# Patient Record
Sex: MALE | Race: WHITE | NOT HISPANIC OR LATINO | URBAN - METROPOLITAN AREA
[De-identification: names, ages, dates, MRNs, and addresses within clinical notes are randomized per-mention and may not be internally consistent; named-entity substitution may affect disease eponyms.]

---

## 2020-01-28 ENCOUNTER — OUTPATIENT (OUTPATIENT)
Dept: OUTPATIENT SERVICES | Facility: HOSPITAL | Age: 70
LOS: 1 days | Discharge: HOME | End: 2020-01-28
Payer: MEDICARE

## 2020-01-28 VITALS
DIASTOLIC BLOOD PRESSURE: 80 MMHG | RESPIRATION RATE: 16 BRPM | OXYGEN SATURATION: 96 % | WEIGHT: 186.73 LBS | HEIGHT: 68 IN | SYSTOLIC BLOOD PRESSURE: 145 MMHG | TEMPERATURE: 98 F | HEART RATE: 78 BPM

## 2020-01-28 DIAGNOSIS — Z98.890 OTHER SPECIFIED POSTPROCEDURAL STATES: Chronic | ICD-10-CM

## 2020-01-28 DIAGNOSIS — M19.90 UNSPECIFIED OSTEOARTHRITIS, UNSPECIFIED SITE: ICD-10-CM

## 2020-01-28 DIAGNOSIS — Z87.81 PERSONAL HISTORY OF (HEALED) TRAUMATIC FRACTURE: Chronic | ICD-10-CM

## 2020-01-28 DIAGNOSIS — I10 ESSENTIAL (PRIMARY) HYPERTENSION: ICD-10-CM

## 2020-01-28 DIAGNOSIS — Z01.818 ENCOUNTER FOR OTHER PREPROCEDURAL EXAMINATION: ICD-10-CM

## 2020-01-28 DIAGNOSIS — E11.9 TYPE 2 DIABETES MELLITUS WITHOUT COMPLICATIONS: ICD-10-CM

## 2020-01-28 DIAGNOSIS — I25.10 ATHEROSCLEROTIC HEART DISEASE OF NATIVE CORONARY ARTERY WITHOUT ANGINA PECTORIS: ICD-10-CM

## 2020-01-28 DIAGNOSIS — Z96.0 PRESENCE OF UROGENITAL IMPLANTS: Chronic | ICD-10-CM

## 2020-01-28 DIAGNOSIS — M16.12 UNILATERAL PRIMARY OSTEOARTHRITIS, LEFT HIP: ICD-10-CM

## 2020-01-28 LAB
ANION GAP SERPL CALC-SCNC: 6 MMOL/L — SIGNIFICANT CHANGE UP (ref 5–17)
APTT BLD: 27.1 SEC — LOW (ref 28.5–37)
BASOPHILS # BLD AUTO: 0.03 K/UL — SIGNIFICANT CHANGE UP (ref 0–0.2)
BASOPHILS NFR BLD AUTO: 0.4 % — SIGNIFICANT CHANGE UP (ref 0–2)
BUN SERPL-MCNC: 19 MG/DL — SIGNIFICANT CHANGE UP (ref 7–23)
CALCIUM SERPL-MCNC: 9.4 MG/DL — SIGNIFICANT CHANGE UP (ref 8.5–10.1)
CHLORIDE SERPL-SCNC: 104 MMOL/L — SIGNIFICANT CHANGE UP (ref 96–108)
CO2 SERPL-SCNC: 28 MMOL/L — SIGNIFICANT CHANGE UP (ref 22–31)
CREAT SERPL-MCNC: 0.95 MG/DL — SIGNIFICANT CHANGE UP (ref 0.5–1.3)
EOSINOPHIL # BLD AUTO: 0.15 K/UL — SIGNIFICANT CHANGE UP (ref 0–0.5)
EOSINOPHIL NFR BLD AUTO: 2 % — SIGNIFICANT CHANGE UP (ref 0–6)
GLUCOSE SERPL-MCNC: 112 MG/DL — HIGH (ref 70–99)
HBA1C BLD-MCNC: 6.6 % — HIGH (ref 4–5.6)
HCT VFR BLD CALC: 38.9 % — LOW (ref 39–50)
HGB BLD-MCNC: 13.3 G/DL — SIGNIFICANT CHANGE UP (ref 13–17)
IMM GRANULOCYTES NFR BLD AUTO: 0.4 % — SIGNIFICANT CHANGE UP (ref 0–1.5)
INR BLD: 1.01 RATIO — SIGNIFICANT CHANGE UP (ref 0.88–1.16)
LYMPHOCYTES # BLD AUTO: 1.44 K/UL — SIGNIFICANT CHANGE UP (ref 1–3.3)
LYMPHOCYTES # BLD AUTO: 18.7 % — SIGNIFICANT CHANGE UP (ref 13–44)
MCHC RBC-ENTMCNC: 31.4 PG — SIGNIFICANT CHANGE UP (ref 27–34)
MCHC RBC-ENTMCNC: 34.2 GM/DL — SIGNIFICANT CHANGE UP (ref 32–36)
MCV RBC AUTO: 91.7 FL — SIGNIFICANT CHANGE UP (ref 80–100)
MONOCYTES # BLD AUTO: 0.69 K/UL — SIGNIFICANT CHANGE UP (ref 0–0.9)
MONOCYTES NFR BLD AUTO: 9 % — SIGNIFICANT CHANGE UP (ref 2–14)
MRSA PCR RESULT.: SIGNIFICANT CHANGE UP
NEUTROPHILS # BLD AUTO: 5.35 K/UL — SIGNIFICANT CHANGE UP (ref 1.8–7.4)
NEUTROPHILS NFR BLD AUTO: 69.5 % — SIGNIFICANT CHANGE UP (ref 43–77)
NRBC # BLD: 0 /100 WBCS — SIGNIFICANT CHANGE UP (ref 0–0)
PLATELET # BLD AUTO: 143 K/UL — LOW (ref 150–400)
POTASSIUM SERPL-MCNC: 4.5 MMOL/L — SIGNIFICANT CHANGE UP (ref 3.5–5.3)
POTASSIUM SERPL-SCNC: 4.5 MMOL/L — SIGNIFICANT CHANGE UP (ref 3.5–5.3)
PROTHROM AB SERPL-ACNC: 11.3 SEC — SIGNIFICANT CHANGE UP (ref 10–12.9)
RBC # BLD: 4.24 M/UL — SIGNIFICANT CHANGE UP (ref 4.2–5.8)
RBC # FLD: 13.2 % — SIGNIFICANT CHANGE UP (ref 10.3–14.5)
S AUREUS DNA NOSE QL NAA+PROBE: DETECTED
SODIUM SERPL-SCNC: 138 MMOL/L — SIGNIFICANT CHANGE UP (ref 135–145)
WBC # BLD: 7.69 K/UL — SIGNIFICANT CHANGE UP (ref 3.8–10.5)
WBC # FLD AUTO: 7.69 K/UL — SIGNIFICANT CHANGE UP (ref 3.8–10.5)

## 2020-01-28 PROCEDURE — 93010 ELECTROCARDIOGRAM REPORT: CPT

## 2020-01-28 NOTE — PHYSICAL THERAPY INITIAL EVALUATION ADULT - ADDITIONAL COMMENTS
Pt lives in a private house with a side entrance that has no steps to enter, then once inside there are 4 steps with 1 handrail to get to main living area. There is a full flight with 1 handrail to 2nd floor, only half bathroom on main level. Bathroom on 2nd floor is a tub shower with no grab bars, retractable shower head, standard height toilet. Pt reports that a 3:1 commode can fit over toilet. Pt does not have/use dme. Pain is 2/10 at rest and can increase to 7/10 with yoga, ambulation, donning/doffing socks. Pt finds relief with CBD oil and meditation, tylenol prn. Pt wears glasses, R hand dominant, drives, no hearing aids.

## 2020-01-28 NOTE — H&P PST ADULT - HISTORY OF PRESENT ILLNESS
70 year old male with PMH of HTN, DM, Stilos disease presents with left hip pain secondary to osteoarthritis - scheduled for left hip arthroplasty  Goal: Resume Yoga

## 2020-01-28 NOTE — H&P PST ADULT - NSICDXPASTMEDICALHX_GEN_ALL_CORE_FT
PAST MEDICAL HISTORY:  BPH without urinary obstruction     DM (diabetes mellitus)     HTN (hypertension) PAST MEDICAL HISTORY:  BPH without urinary obstruction     DM (diabetes mellitus)     H/O ulcerative colitis     HTN (hypertension)

## 2020-01-28 NOTE — H&P PST ADULT - NSICDXPROBLEM_GEN_ALL_CORE_FT
PROBLEM DIAGNOSES  Problem: Osteoarthritis  Assessment and Plan: scheduled for left hip arthroplasty    Problem: Preoperative examination  Assessment and Plan: labs - cbc,pt/ptt,bmp,t&s,nose cx,ekg  M/C required  preop 3 day hibiclens instruction reviewed and given .instructed on if  nose cx positive use mupuricin 5 days and checklist given  take routine meds DOS with sips of water. avoid NSAID and OTC supplements. verbalized understanding  information on proper nutrition , increase protein and better food choices provided in packet      Problem: Arteriosclerotic heart disease (ASHD)  Assessment and Plan: continue meds    Problem: DM (diabetes mellitus)  Assessment and Plan: continue meds    Problem: HTN (hypertension)  Assessment and Plan: continue meds

## 2020-01-28 NOTE — H&P PST ADULT - ASSESSMENT
osteoarthritis left hip  CAPRINI SCORE    AGE RELATED RISK FACTORS                                                       MOBILITY RELATED FACTORS  [ ] Age 41-60 years                                            (1 Point)                  [ ] Bed rest                                                        (1 Point)  [x] Age: 61-74 years                                           (2 Points)                [ ] Plaster cast                                                   (2 Points)  [ ] Age= 75 years                                              (3 Points)                 [ ] Bed bound for more than 72 hours                   (2 Points)    DISEASE RELATED RISK FACTORS                                               GENDER SPECIFIC FACTORS  [ ] Edema in the lower extremities                       (1 Point)                  [ ] Pregnancy                                                     (1 Point)  [ ] Varicose veins                                               (1 Point)                  [ ] Post-partum < 6 weeks                                   (1 Point)             [x ] BMI > 25 Kg/m2                                            (1 Point)                  [ ] Hormonal therapy  or oral contraception            (1 Point)                 [ ] Sepsis (in the previous month)                        (1 Point)                  [ ] History of pregnancy complications  [ ] Pneumonia or serious lung disease                                               [ ] Unexplained or recurrent                       (1 Point)           (in the previous month)                               (1 Point)  [ ] Abnormal pulmonary function test                     (1 Point)                 SURGERY RELATED RISK FACTORS  [ ] Acute myocardial infarction                              (1 Point)                 [ ]  Section                                            (1 Point)  [ ] Congestive heart failure (in the previous month)  (1 Point)                 [ ] Minor surgery                                                 (1 Point)   [x ] Inflammatory bowel disease                             (1 Point)                 [ ] Arthroscopic surgery                                        (2 Points)  [ ] Central venous access                                    (2 Points)                [ ] General surgery lasting more than 45 minutes   (2 Points)       [ ] Stroke (in the previous month)                          (5 Points)               [x ] Elective arthroplasty                                        (5 Points)                                                                                                                                               HEMATOLOGY RELATED FACTORS                                                 TRAUMA RELATED RISK FACTORS  [ ] Prior episodes of VTE                                     (3 Points)                 [ ] Fracture of the hip, pelvis, or leg                       (5 Points)  [ ] Positive family history for VTE                         (3 Points)                 [ ] Acute spinal cord injury (in the previous month)  (5 Points)  [ ] Prothrombin 44355 A                                      (3 Points)                 [ ] Paralysis  (less than 1 month)                          (5 Points)  [ ] Factor V Leiden                                             (3 Points)                 [ ] Multiple Trauma within 1 month                         (5 Points)  [ ] Lupus anticoagulants                                     (3 Points)                                                           [ ] Anticardiolipin antibodies                                (3 Points)                                                       [ ] High homocysteine in the blood                      (3 Points)                                             [ ] Other congenital or acquired thrombophilia       (3 Points)                                                [ ] Heparin induced thrombocytopenia                  (3 Points)                                          Total Score [     9     ]  Caprini Score 0-2: Low risk, No VTE Prophylaxis required for most patient's, encourage ambulation  Caprini Score 3-6: At Risk, Pharmacologic VTE prophylaxis is indicated for most patients ( in the absence of a contraindication)  Caprini Score Greater than or = 7: High Risk , pharmacologic VTE is indicated for most patients ( in the absence of a contraindication)    Caprini score indicates that the patient is high risk for VTE event ( score 6 or greater). Surgical patient's in this group will benefit from both pharmacologic prophylaxis and intermittent compression devices . Surgical team will determine the balance between VTE  risk and bleeding risk and other clinical considerations

## 2020-01-28 NOTE — PHYSICAL THERAPY INITIAL EVALUATION ADULT - MODIFIED CLINICAL TEST OF SENSORY INTEGRATION IN BALANCE TEST
5x Sit to Stand Test = (no hands use) 20.40 seconds, indicating significant impairment c functional mobility & strength  ; 2 Minute Walk Test = 500 feet without devices or rest stops

## 2020-01-28 NOTE — H&P PST ADULT - NSICDXPASTSURGICALHX_GEN_ALL_CORE_FT
PAST SURGICAL HISTORY:  H/O fracture of arm     History of penile implant     S/P hip arthroscopy     S/P primary angioplasty x 2v stents 2000 and 2016    S/P surgery on nasal septum

## 2020-01-29 RX ORDER — MUPIROCIN 20 MG/G
1 OINTMENT TOPICAL
Qty: 1 | Refills: 0
Start: 2020-01-29 | End: 2020-02-02

## 2020-02-12 ENCOUNTER — OUTPATIENT (OUTPATIENT)
Dept: OUTPATIENT SERVICES | Facility: HOSPITAL | Age: 70
LOS: 1 days | Discharge: HOME HEALTH | End: 2020-02-12

## 2020-02-12 DIAGNOSIS — Z87.81 PERSONAL HISTORY OF (HEALED) TRAUMATIC FRACTURE: Chronic | ICD-10-CM

## 2020-02-12 DIAGNOSIS — Z98.890 OTHER SPECIFIED POSTPROCEDURAL STATES: Chronic | ICD-10-CM

## 2020-02-12 DIAGNOSIS — Z96.0 PRESENCE OF UROGENITAL IMPLANTS: Chronic | ICD-10-CM

## 2020-02-12 RX ORDER — TAMSULOSIN HYDROCHLORIDE 0.4 MG/1
1 CAPSULE ORAL
Qty: 0 | Refills: 0 | DISCHARGE

## 2020-02-12 RX ORDER — LISINOPRIL 2.5 MG/1
1 TABLET ORAL
Qty: 0 | Refills: 0 | DISCHARGE

## 2020-02-12 RX ORDER — ALLOPURINOL 300 MG
1 TABLET ORAL
Qty: 0 | Refills: 0 | DISCHARGE

## 2020-02-12 RX ORDER — METFORMIN HYDROCHLORIDE 850 MG/1
1 TABLET ORAL
Qty: 0 | Refills: 0 | DISCHARGE

## 2020-02-12 RX ORDER — SAXAGLIPTIN 5 MG/1
1 TABLET, FILM COATED ORAL
Qty: 0 | Refills: 0 | DISCHARGE

## 2020-02-13 RX ORDER — ASPIRIN/CALCIUM CARB/MAGNESIUM 324 MG
1 TABLET ORAL
Qty: 0 | Refills: 0 | DISCHARGE

## 2020-02-17 ENCOUNTER — EMERGENCY (EMERGENCY)
Facility: HOSPITAL | Age: 70
LOS: 0 days | Discharge: HOME | End: 2020-02-18
Attending: EMERGENCY MEDICINE
Payer: MEDICARE

## 2020-02-17 VITALS
HEART RATE: 72 BPM | DIASTOLIC BLOOD PRESSURE: 69 MMHG | RESPIRATION RATE: 18 BRPM | SYSTOLIC BLOOD PRESSURE: 147 MMHG | TEMPERATURE: 99 F | OXYGEN SATURATION: 98 %

## 2020-02-17 VITALS
HEART RATE: 85 BPM | OXYGEN SATURATION: 99 % | DIASTOLIC BLOOD PRESSURE: 71 MMHG | TEMPERATURE: 99 F | RESPIRATION RATE: 18 BRPM | HEIGHT: 68 IN | SYSTOLIC BLOOD PRESSURE: 145 MMHG | WEIGHT: 175.05 LBS

## 2020-02-17 DIAGNOSIS — D64.89 OTHER SPECIFIED ANEMIAS: ICD-10-CM

## 2020-02-17 DIAGNOSIS — Z91.013 ALLERGY TO SEAFOOD: ICD-10-CM

## 2020-02-17 DIAGNOSIS — R06.00 DYSPNEA, UNSPECIFIED: ICD-10-CM

## 2020-02-17 DIAGNOSIS — Z79.82 LONG TERM (CURRENT) USE OF ASPIRIN: ICD-10-CM

## 2020-02-17 DIAGNOSIS — Z79.84 LONG TERM (CURRENT) USE OF ORAL HYPOGLYCEMIC DRUGS: ICD-10-CM

## 2020-02-17 DIAGNOSIS — Z98.890 OTHER SPECIFIED POSTPROCEDURAL STATES: Chronic | ICD-10-CM

## 2020-02-17 DIAGNOSIS — R68.83 CHILLS (WITHOUT FEVER): ICD-10-CM

## 2020-02-17 DIAGNOSIS — R06.02 SHORTNESS OF BREATH: ICD-10-CM

## 2020-02-17 DIAGNOSIS — Z96.0 PRESENCE OF UROGENITAL IMPLANTS: Chronic | ICD-10-CM

## 2020-02-17 DIAGNOSIS — E11.9 TYPE 2 DIABETES MELLITUS WITHOUT COMPLICATIONS: ICD-10-CM

## 2020-02-17 DIAGNOSIS — N40.0 BENIGN PROSTATIC HYPERPLASIA WITHOUT LOWER URINARY TRACT SYMPTOMS: ICD-10-CM

## 2020-02-17 DIAGNOSIS — I10 ESSENTIAL (PRIMARY) HYPERTENSION: ICD-10-CM

## 2020-02-17 DIAGNOSIS — Z87.81 PERSONAL HISTORY OF (HEALED) TRAUMATIC FRACTURE: Chronic | ICD-10-CM

## 2020-02-17 DIAGNOSIS — Z88.0 ALLERGY STATUS TO PENICILLIN: ICD-10-CM

## 2020-02-17 DIAGNOSIS — Z88.8 ALLERGY STATUS TO OTHER DRUGS, MEDICAMENTS AND BIOLOGICAL SUBSTANCES STATUS: ICD-10-CM

## 2020-02-17 PROBLEM — Z87.19 PERSONAL HISTORY OF OTHER DISEASES OF THE DIGESTIVE SYSTEM: Chronic | Status: ACTIVE | Noted: 2020-01-29

## 2020-02-17 LAB
ALBUMIN SERPL ELPH-MCNC: 2.9 G/DL — LOW (ref 3.3–5)
ALP SERPL-CCNC: 56 U/L — SIGNIFICANT CHANGE UP (ref 40–120)
ALT FLD-CCNC: 28 U/L — SIGNIFICANT CHANGE UP (ref 12–78)
ANION GAP SERPL CALC-SCNC: 6 MMOL/L — SIGNIFICANT CHANGE UP (ref 5–17)
APTT BLD: 26.8 SEC — LOW (ref 27.5–36.3)
AST SERPL-CCNC: 31 U/L — SIGNIFICANT CHANGE UP (ref 15–37)
BASOPHILS # BLD AUTO: 0.01 K/UL — SIGNIFICANT CHANGE UP (ref 0–0.2)
BASOPHILS NFR BLD AUTO: 0.2 % — SIGNIFICANT CHANGE UP (ref 0–2)
BILIRUB SERPL-MCNC: 1 MG/DL — SIGNIFICANT CHANGE UP (ref 0.2–1.2)
BUN SERPL-MCNC: 17 MG/DL — SIGNIFICANT CHANGE UP (ref 7–23)
CALCIUM SERPL-MCNC: 8.9 MG/DL — SIGNIFICANT CHANGE UP (ref 8.5–10.1)
CHLORIDE SERPL-SCNC: 102 MMOL/L — SIGNIFICANT CHANGE UP (ref 96–108)
CO2 SERPL-SCNC: 29 MMOL/L — SIGNIFICANT CHANGE UP (ref 22–31)
CREAT SERPL-MCNC: 0.9 MG/DL — SIGNIFICANT CHANGE UP (ref 0.5–1.3)
EOSINOPHIL # BLD AUTO: 0.09 K/UL — SIGNIFICANT CHANGE UP (ref 0–0.5)
EOSINOPHIL NFR BLD AUTO: 1.8 % — SIGNIFICANT CHANGE UP (ref 0–6)
GLUCOSE SERPL-MCNC: 163 MG/DL — HIGH (ref 70–99)
HCT VFR BLD CALC: 21.5 % — LOW (ref 39–50)
HCT VFR BLD CALC: 23.6 % — LOW (ref 39–50)
HGB BLD-MCNC: 7.4 G/DL — LOW (ref 13–17)
HGB BLD-MCNC: 8.2 G/DL — LOW (ref 13–17)
IMM GRANULOCYTES NFR BLD AUTO: 0.6 % — SIGNIFICANT CHANGE UP (ref 0–1.5)
INR BLD: 1.03 RATIO — SIGNIFICANT CHANGE UP (ref 0.88–1.16)
LYMPHOCYTES # BLD AUTO: 0.85 K/UL — LOW (ref 1–3.3)
LYMPHOCYTES # BLD AUTO: 17.4 % — SIGNIFICANT CHANGE UP (ref 13–44)
MCHC RBC-ENTMCNC: 31.9 PG — SIGNIFICANT CHANGE UP (ref 27–34)
MCHC RBC-ENTMCNC: 32 PG — SIGNIFICANT CHANGE UP (ref 27–34)
MCHC RBC-ENTMCNC: 34.4 GM/DL — SIGNIFICANT CHANGE UP (ref 32–36)
MCHC RBC-ENTMCNC: 34.7 GM/DL — SIGNIFICANT CHANGE UP (ref 32–36)
MCV RBC AUTO: 91.8 FL — SIGNIFICANT CHANGE UP (ref 80–100)
MCV RBC AUTO: 93.1 FL — SIGNIFICANT CHANGE UP (ref 80–100)
MONOCYTES # BLD AUTO: 0.47 K/UL — SIGNIFICANT CHANGE UP (ref 0–0.9)
MONOCYTES NFR BLD AUTO: 9.6 % — SIGNIFICANT CHANGE UP (ref 2–14)
NEUTROPHILS # BLD AUTO: 3.43 K/UL — SIGNIFICANT CHANGE UP (ref 1.8–7.4)
NEUTROPHILS NFR BLD AUTO: 70.4 % — SIGNIFICANT CHANGE UP (ref 43–77)
NRBC # BLD: 0 /100 WBCS — SIGNIFICANT CHANGE UP (ref 0–0)
NRBC # BLD: 0 /100 WBCS — SIGNIFICANT CHANGE UP (ref 0–0)
NT-PROBNP SERPL-SCNC: 256 PG/ML — HIGH (ref 0–125)
PLATELET # BLD AUTO: 124 K/UL — LOW (ref 150–400)
PLATELET # BLD AUTO: 134 K/UL — LOW (ref 150–400)
POTASSIUM SERPL-MCNC: 4 MMOL/L — SIGNIFICANT CHANGE UP (ref 3.5–5.3)
POTASSIUM SERPL-SCNC: 4 MMOL/L — SIGNIFICANT CHANGE UP (ref 3.5–5.3)
PROT SERPL-MCNC: 6 GM/DL — SIGNIFICANT CHANGE UP (ref 6–8.3)
PROTHROM AB SERPL-ACNC: 11.5 SEC — SIGNIFICANT CHANGE UP (ref 10–12.9)
RBC # BLD: 2.31 M/UL — LOW (ref 4.2–5.8)
RBC # BLD: 2.57 M/UL — LOW (ref 4.2–5.8)
RBC # FLD: 13.3 % — SIGNIFICANT CHANGE UP (ref 10.3–14.5)
RBC # FLD: 13.4 % — SIGNIFICANT CHANGE UP (ref 10.3–14.5)
SODIUM SERPL-SCNC: 137 MMOL/L — SIGNIFICANT CHANGE UP (ref 135–145)
TROPONIN I SERPL-MCNC: <.015 NG/ML — SIGNIFICANT CHANGE UP (ref 0.01–0.04)
TROPONIN I SERPL-MCNC: <.015 NG/ML — SIGNIFICANT CHANGE UP (ref 0.01–0.04)
WBC # BLD: 4.88 K/UL — SIGNIFICANT CHANGE UP (ref 3.8–10.5)
WBC # BLD: 5.33 K/UL — SIGNIFICANT CHANGE UP (ref 3.8–10.5)
WBC # FLD AUTO: 4.88 K/UL — SIGNIFICANT CHANGE UP (ref 3.8–10.5)
WBC # FLD AUTO: 5.33 K/UL — SIGNIFICANT CHANGE UP (ref 3.8–10.5)

## 2020-02-17 PROCEDURE — 99285 EMERGENCY DEPT VISIT HI MDM: CPT | Mod: GC

## 2020-02-17 PROCEDURE — 71275 CT ANGIOGRAPHY CHEST: CPT | Mod: 26

## 2020-02-17 PROCEDURE — 93010 ELECTROCARDIOGRAM REPORT: CPT

## 2020-02-17 PROCEDURE — 93971 EXTREMITY STUDY: CPT | Mod: 26

## 2020-02-17 NOTE — ED PROVIDER NOTE - PROGRESS NOTE DETAILS
pending transfusion, repeat CBC/trop/Ekg, and reassessment. Patient signed out to incoming physician.  All decisions regarding the progression of care will be made at their discretion. Results reported to patient--grossly benign, repeat CBC improved  Pt. reports feeling better after meds, pt. completed transfusion without issue, calm, comfortable, without complaints now  pt. agrees to f/u with primary care outpt. asap, as well as ortho   pt. understands to return to ED if symptoms worsen; will d/c with bowel regimen (requested by pt. for constipation)

## 2020-02-17 NOTE — ED PROVIDER NOTE - NS ED ROS FT
Please see HPI section of chart for further detailed Review of Systems    shortness of breath, HURTADO   lightheadedness

## 2020-02-17 NOTE — ED ADULT NURSE NOTE - NSIMPLEMENTINTERV_GEN_ALL_ED
Implemented All Universal Safety Interventions:  [place] to call system. Call bell, personal items and telephone within reach. Instruct patient to call for assistance. Room bathroom lighting operational. Non-slip footwear when patient is off stretcher. Physically safe environment: no spills, clutter or unnecessary equipment. Stretcher in lowest position, wheels locked, appropriate side rails in place.

## 2020-02-17 NOTE — CONSULT NOTE ADULT - SUBJECTIVE AND OBJECTIVE BOX
Pt is a 71 yo male s/p left BETI on 2/12/2020 discharged home on 2/13/2020. He has been complaining of symptoms of fatigue/weakness and SOB. Denies CP.  Also C/O LLE swelling. Taking oxycodone 5mg sparingly. States he has not had BM in one week.    PAST MEDICAL & SURGICAL HISTORY:  H/O ulcerative colitis  BPH without urinary obstruction  DM (diabetes mellitus)  HTN (hypertension)  S/P primary angioplasty: x 2v stents 2000 and 2016  History of penile implant  H/O fracture of arm  S/P hip arthroscopy  S/P surgery on nasal septum    Home Medications:  allopurinol 100 mg oral tablet: 1 tab(s) orally 2 times a day (12 Feb 2020 06:49)  ascorbic acid 500 mg oral tablet: 1 tab(s) orally 2 times a day (13 Feb 2020 12:17)  lisinopril 10 mg oral tablet: 1 tab(s) orally once a day (12 Feb 2020 06:49)  metFORMIN 500 mg oral tablet: 1 tab(s) orally 2 times a day (12 Feb 2020 06:49)  Multiple Vitamins oral tablet: 1 tab(s) orally once a day (13 Feb 2020 12:17)  Onglyza 5 mg oral tablet: 1 tab(s) orally once a day (12 Feb 2020 06:49)  senna oral tablet: 2 tab(s) orally once a day (at bedtime), As needed, Constipation (13 Feb 2020 12:17)  tamsulosin 0.4 mg oral capsule: 1 cap(s) orally once a day (12 Feb 2020 06:49)    Allergies    corticosteroids (Other)  penicillins (Hives)  shellfish (Swelling)    Intolerances    Vital Signs Last 24 Hrs  T(C): 37.1 (17 Feb 2020 14:46), Max: 37.1 (17 Feb 2020 14:46)  T(F): 98.7 (17 Feb 2020 14:46), Max: 98.7 (17 Feb 2020 14:46)  HR: 85 (17 Feb 2020 14:46) (85 - 85)  BP: 145/71 (17 Feb 2020 14:46) (145/71 - 145/71)  BP(mean): --  RR: 18 (17 Feb 2020 14:46) (18 - 18)  SpO2: 99% (17 Feb 2020 14:46) (99% - 99%)    Patient is seen and examined at bedside. Denies CP/SOB/Dizziness/N/V/D/HA. Pain is controlled.     Vital Signs Last 24 Hrs  T(C): 37.1 (17 Feb 2020 14:46), Max: 37.1 (17 Feb 2020 14:46)  T(F): 98.7 (17 Feb 2020 14:46), Max: 98.7 (17 Feb 2020 14:46)  HR: 85 (17 Feb 2020 14:46) (85 - 85)  BP: 145/71 (17 Feb 2020 14:46) (145/71 - 145/71)  BP(mean): --  RR: 18 (17 Feb 2020 14:46) (18 - 18)  SpO2: 99% (17 Feb 2020 14:46) (99% - 99%)      Exam:     GEN: Resting comfortably in NAD  Neuro: AAOX3, CNS grossly intact; no focal deficits  ABD: soft, Tympanic. no rebound or guarding;  RLE Motor intact + EHL/FHL/TA/GS. Sensation is grossly intact.  Extremities warm. . Compartments soft, compressible. No calf tenderness. DP 2+.  LLE: WOLF battery flashing green "ok". Dressing C/D/I. Thigh: +swelling +hematoma. Motor intact + EHL/FHL/TA/GS. Sensation is grossly intact. Extremities warm. +Generalized edema.  Compartments soft, compressible. No calf tenderness. DP 2+.    Labs:                          7.4    4.88  )-----------( 124      ( 17 Feb 2020 15:42 )             21.5       02-17    137  |  102  |  17  ----------------------------<  163<H>  4.0   |  29  |  0.90    Ca    8.9      17 Feb 2020 15:42    TPro  x   /  Alb  2.9<L>  /  TBili  x   /  DBili  x   /  AST  31  /  ALT  28  /  AlkPhos  x   02-17

## 2020-02-17 NOTE — ED PROVIDER NOTE - CARE PROVIDER_API CALL
Timothy Gonzalez)  Orthopaedic Surgery  [address], [address]  [place] [address]  Phone: [phone]  Fax: [phone]  Follow Up Time: 4-6 Days

## 2020-02-17 NOTE — CONSULT NOTE ADULT - ASSESSMENT
69 yo male with LLE swelling and SOB s/p L BETI POD#5  -STAT CTPA/Venous doppler lower extrem   -Symptomatic acute post op anemia secondary to blood loss: D/W Pt and ER attending as well as pt PMD: To transfuse 2 units PRBCS today  -Venodynes/foot pumps  -PT/OT/WBAT  -Anticoagulation: Pt was DC'd on ASA 325mg BID - will await test results of CTPA & dopplers to determine if other anticoagulation is needed at this time. If tests are negative would continue ASA 325mg BID.

## 2020-02-17 NOTE — ED PROVIDER NOTE - PMH
BPH without urinary obstruction    DM (diabetes mellitus)    H/O ulcerative colitis    HTN (hypertension)

## 2020-02-17 NOTE — ED PROVIDER NOTE - ATTENDING CONTRIBUTION TO CARE
71yo male with pmh DM, CAD s/p 2 stents, HTN presents with sob s/p LT hip replacement 5 days ago. Pt also reports left janae swelling and dizziness/near syncope.  denies cp.  Of note, pt went from hb 13.5 to 8/5 in 2 weeks.  ortho: Dr Delgado.  PMD Dr BARRY Arzola.    Gen: Alert, + NAD, pale  Head: NC, AT,   Neck: supple, no tenderness/meningismus  Pulm: Bilateral clear BS, normal resp effort  CV: RRR, no M/R/G, +dist pulses   Abd: soft, NT/ND, +BS, no guarding/rebound tenderness  Mskel:   Skin: no rash, no bruising  Neuro: AAOx3, no sensory/motor deficits, 69yo male with pmh DM, CAD s/p 2 stents, HTN presents with sob s/p LT hip replacement 5 days ago. Pt also reports left janae swelling and dizziness/near syncope.  denies cp.  Of note, pt went from hb 13.5 to 8/5 in 2 weeks.  ortho: Dr Delgado.  PMD Dr BARRY Arzola.    Gen: Alert, + NAD, pale  Head: NC, AT,   Neck: supple, no tenderness/meningismus  Pulm: Bilateral clear BS, normal resp effort  CV: RRR, no M/R/G, +dist pulses   Abd: soft, NT/ND, +BS, no guarding/rebound tenderness  Mskel:   Skin: no rash, no bruising  Neuro: AAOx3, no sensory/motor deficits,    plan: CT/SOno, labs,    CT/Sono neg for DVT.  Pt noted to drop 5 units in 2 weeks, will transfuse 2U, pending transfusion, repeat CBC/Trop. Patient signed out.

## 2020-02-17 NOTE — ED PROVIDER NOTE - CLINICAL SUMMARY MEDICAL DECISION MAKING FREE TEXT BOX
70M w hx DM, CAD w stents presenting w shortness of breath, HURTADO, lightheadedness. +LLE swelling. Recent hip surgery. Exam as above. Vitals wnl. (not tachycardic, tachypneic, hypoxic). Concern for PE vs ACS vs Anemia (recent drop in Hgb) vs other. Will check CT chest, Duplex LLE, labs, EKG. Currently stable, no acute distress. Will continue to follow up and re-assess. Case discussed with Attending.  Levi Viera MD, PGY3 Emergency Medicine

## 2020-02-17 NOTE — ED PROVIDER NOTE - PHYSICAL EXAMINATION
General: Well appearing, awake, alert, oriented, no acute distress. Resting in bed.  HEENT: PERRLA EOMI. No trauma/bruising noted to head or face. No rhinorrhea noted.   CV: Regular rate and rhythm, S1/S2, no murmurs/rubs/gallops noted on exam. No tenderness to palpation to chest wall.   Lungs: Clear to ascultation bilaterally, no wheezes/crackles/rales noted on exam. Equal chest wall excursion noted.   Abdomen: Soft, non tender, non distended, no guarding or rebound.   MSK: Intact ROM of upper and lower extremities bilaterally. No tenderness to palpation to extremities. Intact ROM of neck. No gross deformities noted to extremities.   Neuro: Awake, A+O x4, moving all extremities spontaneously. CN 2-12 grossly intact. No nystagmus noted. Strength and sensation grossly intact to all extremities. Speech fluent, no slurred speech.   Extremities: LEFT CALF WITH SWELLING, MILD EDEMA. No calf tenderness to palpation.   Skin: No rash or bruising noted on exam.

## 2020-02-17 NOTE — ED PROVIDER NOTE - OBJECTIVE STATEMENT
70M, hx of DM, CAD s/p 2 stents, HTN presents w chief complaint of shortness of breath. patient underwent left hip replacement on 2/12/2020 w Dr Delgado. Patient states that since surgery, he has been experiencing left calf swelling, shortness of breath, HURTADO, and lightheadedness with exertion. +intermittent chills. No headache, dizziness, blurry vision, chest pain, cough, abdominal pain, fevers, nausea or vomiting. Denies hx DVT, PE. Denies current tobacco, ethanol, or drug use (former smoker). Allergy to steroids, PCN. PMD Dr BARRY Arzola.

## 2020-02-17 NOTE — ED PROVIDER NOTE - PATIENT PORTAL LINK FT
You can access the FollowMyHealth Patient Portal offered by [facility] by registering at the following website: [email or web address]. By joining [employer]’s FollowMyHealth portal, you will also be able to view your health information using other applications (apps) compatible with our system.

## 2020-02-17 NOTE — ED PROVIDER NOTE - PSH
H/O fracture of arm    History of penile implant    S/P hip arthroscopy    S/P primary angioplasty  x 2v stents 2000 and 2016  S/P surgery on nasal septum

## 2020-02-17 NOTE — ED ADULT TRIAGE NOTE - CHIEF COMPLAINT QUOTE
c/o difficulty breathing since thursday s/p l hip surgery THR on wednesday denies chest pain referred to er per surgeon states surgical pa to see pt in er surgeon was evgeny

## 2020-02-18 RX ORDER — SENNA PLUS 8.6 MG/1
2 TABLET ORAL
Qty: 6 | Refills: 0
Start: 2020-02-18 | End: 2020-02-20

## 2020-02-18 RX ORDER — MULTIVIT WITH MIN/MFOLATE/K2 340-15/3 G
2 POWDER (GRAM) ORAL
Qty: 2 | Refills: 0
Start: 2020-02-18 | End: 2020-02-18

## 2020-02-18 RX ORDER — DOCUSATE SODIUM 100 MG
1 CAPSULE ORAL
Qty: 10 | Refills: 0
Start: 2020-02-18 | End: 2020-02-22

## 2020-02-27 DIAGNOSIS — M16.12 UNILATERAL PRIMARY OSTEOARTHRITIS, LEFT HIP: ICD-10-CM

## 2020-02-27 DIAGNOSIS — E11.9 TYPE 2 DIABETES MELLITUS WITHOUT COMPLICATIONS: ICD-10-CM

## 2020-02-27 DIAGNOSIS — Z88.0 ALLERGY STATUS TO PENICILLIN: ICD-10-CM

## 2020-02-27 DIAGNOSIS — I10 ESSENTIAL (PRIMARY) HYPERTENSION: ICD-10-CM

## 2020-02-27 DIAGNOSIS — Z95.5 PRESENCE OF CORONARY ANGIOPLASTY IMPLANT AND GRAFT: ICD-10-CM

## 2020-02-27 DIAGNOSIS — Z79.84 LONG TERM (CURRENT) USE OF ORAL HYPOGLYCEMIC DRUGS: ICD-10-CM

## 2020-02-27 DIAGNOSIS — Z79.82 LONG TERM (CURRENT) USE OF ASPIRIN: ICD-10-CM

## 2024-05-17 ENCOUNTER — NON-APPOINTMENT (OUTPATIENT)
Age: 74
End: 2024-05-17

## 2024-08-16 PROBLEM — Z00.00 ENCOUNTER FOR PREVENTIVE HEALTH EXAMINATION: Status: ACTIVE | Noted: 2024-08-16

## 2024-08-26 ENCOUNTER — APPOINTMENT (OUTPATIENT)
Dept: CARDIOLOGY | Facility: CLINIC | Age: 74
End: 2024-08-26

## 2024-08-26 VITALS
OXYGEN SATURATION: 97 % | HEART RATE: 63 BPM | BODY MASS INDEX: 27.99 KG/M2 | SYSTOLIC BLOOD PRESSURE: 124 MMHG | DIASTOLIC BLOOD PRESSURE: 62 MMHG | HEIGHT: 66 IN | TEMPERATURE: 99.1 F | WEIGHT: 174.16 LBS

## 2024-08-26 DIAGNOSIS — I10 ESSENTIAL (PRIMARY) HYPERTENSION: ICD-10-CM

## 2024-08-26 DIAGNOSIS — I25.10 ATHEROSCLEROTIC HEART DISEASE OF NATIVE CORONARY ARTERY W/OUT ANGINA PECTORIS: ICD-10-CM

## 2024-08-26 DIAGNOSIS — E11.8 TYPE 2 DIABETES MELLITUS WITH UNSPECIFIED COMPLICATIONS: ICD-10-CM

## 2024-08-26 DIAGNOSIS — Z92.3 PERSONAL HISTORY OF IRRADIATION: ICD-10-CM

## 2024-08-26 DIAGNOSIS — I95.1 ORTHOSTATIC HYPOTENSION: ICD-10-CM

## 2024-08-26 DIAGNOSIS — Z87.438 PERSONAL HISTORY OF OTHER DISEASES OF MALE GENITAL ORGANS: ICD-10-CM

## 2024-08-26 DIAGNOSIS — C18.9 MALIGNANT NEOPLASM OF COLON, UNSPECIFIED: ICD-10-CM

## 2024-08-26 DIAGNOSIS — K51.90 ULCERATIVE COLITIS, UNSPECIFIED, W/OUT COMPLICATIONS: ICD-10-CM

## 2024-08-26 PROCEDURE — 93000 ELECTROCARDIOGRAM COMPLETE: CPT | Mod: 59

## 2024-08-26 PROCEDURE — 93246 EXT ECG>7D<15D RECORDING: CPT

## 2024-08-26 PROCEDURE — 99205 OFFICE O/P NEW HI 60 MIN: CPT | Mod: 25

## 2024-08-26 RX ORDER — METFORMIN HYDROCHLORIDE 1000 MG/1
1000 TABLET, COATED ORAL DAILY
Refills: 0 | Status: ACTIVE | COMMUNITY
Start: 2024-08-26

## 2024-08-26 RX ORDER — ALLOPURINOL 100 MG/1
100 TABLET ORAL DAILY
Qty: 90 | Refills: 0 | Status: ACTIVE | COMMUNITY
Start: 2024-08-26

## 2024-08-26 RX ORDER — LISINOPRIL 5 MG/1
5 TABLET ORAL DAILY
Qty: 1 | Refills: 3 | Status: ACTIVE | COMMUNITY
Start: 2024-08-26

## 2024-08-26 RX ORDER — EMPAGLIFLOZIN 25 MG/1
25 TABLET, FILM COATED ORAL DAILY
Qty: 30 | Refills: 6 | Status: ACTIVE | COMMUNITY
Start: 2024-08-26

## 2024-08-26 RX ORDER — SILDENAFIL 50 MG/1
50 TABLET ORAL DAILY
Refills: 0 | Status: ACTIVE | COMMUNITY
Start: 2024-08-26

## 2024-08-26 RX ORDER — EVOLOCUMAB 140 MG/ML
140 INJECTION, SOLUTION SUBCUTANEOUS
Qty: 2 | Refills: 6 | Status: ACTIVE | COMMUNITY
Start: 2024-08-26

## 2024-08-26 RX ORDER — TAMSULOSIN HYDROCHLORIDE 0.4 MG/1
0.4 CAPSULE ORAL
Qty: 21 | Refills: 0 | Status: ACTIVE | COMMUNITY
Start: 2024-08-26

## 2024-08-26 NOTE — HISTORY OF PRESENT ILLNESS
[FreeTextEntry1] : The patient presents with symptoms of orthostatic hypotension, including lightheadedness, dizziness, vision disturbances, and cognitive difficulties.  Mr. Romano reports a history of orthostatic hypotension for approximately 2-3 years, becoming more pronounced following chemotherapy for colon cancer starting in 2023. Symptoms include significant fluctuations in blood pressure, with variations of 40-60 mmHg at times, associated with episodes of near syncope, lightheadedness, and cognitive impairment such as difficulty concentrating and memory lapses. The patient experiences these symptoms daily and has been self-managing with dietary changes, physical maneuvers, and frequent blood pressure monitoring. His cardiologist advised he stop metoprolol due to these symptoms, which he reports not taking since 2024. His doctor also prescribed fludrocortisone, which the patient states caused severe diarrhea and incontinence of bowel.  He has long standing type 2 diabetes (for about 25 years), which more recently has been better controlled through diet and medications. He has a history ulcerative colitis treated with mesalamine treated in his 30s and 40s ([facility]) but has been in remission off immunosuppression for about 30 years.  Past Medical History: - Stills disease - ulcerative colitis treated with mesalamine, but not requiring immunosuppression since his 40s - Exploratory surgery for fever of unknown origin approximately 20 years ago ([facility]) - Diabetes for 20-25 years - hypertension, currently on lisinopril 5 mg daily - Hip replacement in 2019 - coronary bypass surgery in 2020 - Cataract surgery in May 2023 - Colon cancer diagnosed in 2023  Medications: - Allopurinol - Lisinopril 5 mg - Metformin 500/1000 daily - Empagliflozin (Jardiance) 25 - Evolocumab (Repatha) - reports intolerance to a number of statins - Sildenafil (daily) 20 mg recommended by radiation oncologist to improve blood flow - Note: Metoprolol was discontinued in 2024  Social History: - Lives independently in [place], [place] with a long-haired Sandip Sethi - Does not consume alcohol, smoke, or use recreational drugs - Practicing , works from home - Engages in daily physical activities including walking, yoga, weight training, and meditation - healthy dietary habits including restricted salt and processed foods   Cardiovascular Summary: ---------------------------------------------- EC2024: NSR 63 bpm 1st degree AV block, otherwise normal ---------------------------------------------- Echo: ---------------------------------------------- Stress: ---------------------------------------------- CT/MRI ---------------------------------------------- Remote/ambulatory rhythm monitoring:

## 2024-08-26 NOTE — REVIEW OF SYSTEMS
[FreeTextEntry2] : Positive for dizziness, lightheadedness, vision disturbances, and cognitive impairment

## 2024-08-26 NOTE — REASON FOR VISIT
[Formal Caregiver] : formal caregiver [FreeTextEntry3] : [facility] (Dr. Chisholm) [FreeTextEntry1] : ------------------------------------------------------------------------ ALAINA RIDER is a 74 year old man with a history of ulcerative colitis, type 2 diabetes, HTN, CAD s/p PCI and CABG 2020, locally advanced rectal cancer s/p chemotherapy and radiation seen for orthostatic hypotension.   Prior Cancer Treatments: ------------------------------------------------------------------------ Chemo/targeted therapy: 9/27/2023-11/2023: capecitabine + radiation 12/2023-2/2024: Cap-Ox x4 ------------------------------------------------------------------------ Surgery: ------------------------------------------------------------------------ Radiation: 9/2023-11/2023: 5400 cGy

## 2024-08-26 NOTE — PHYSICAL EXAM
[Well Developed] : well developed [Well Nourished] : well nourished [No Acute Distress] : no acute distress [Normal Conjunctiva] : normal conjunctiva [Normal Venous Pressure] : normal venous pressure [No Carotid Bruit] : no carotid bruit [Normal S1, S2] : normal S1, S2 [No Murmur] : no murmur [No Rub] : no rub [No Gallop] : no gallop [Clear Lung Fields] : clear lung fields [Good Air Entry] : good air entry [No Respiratory Distress] : no respiratory distress  [Soft] : abdomen soft [Normal Gait] : normal gait [Gait - Sufficient for Exercise Testing] : gait - sufficient for exercise testing [No Edema] : no edema [No Rash] : no rash [Moves all extremities] : moves all extremities [Normal Speech] : normal speech [Alert and Oriented] : alert and oriented [Normal memory] : normal memory

## 2024-08-26 NOTE — HISTORY OF PRESENT ILLNESS
[FreeTextEntry1] : The patient presents with symptoms of orthostatic hypotension, including lightheadedness, dizziness, vision disturbances, and cognitive difficulties.  Mr. Romano reports a history of orthostatic hypotension for approximately 2-3 years, becoming more pronounced following chemotherapy for colon cancer starting in 2023. Symptoms include significant fluctuations in blood pressure, with variations of 40-60 mmHg at times, associated with episodes of near syncope, lightheadedness, and cognitive impairment such as difficulty concentrating and memory lapses. The patient experiences these symptoms daily and has been self-managing with dietary changes, physical maneuvers, and frequent blood pressure monitoring. His cardiologist advised he stop metoprolol due to these symptoms, which he reports not taking since 2024. His doctor also prescribed fludrocortisone, which the patient states caused severe diarrhea and incontinence of bowel.  He has long standing type 2 diabetes (for about 25 years), which more recently has been better controlled through diet and medications. He has a history ulcerative colitis treated with mesalamine treated in his 30s and 40s ([facility]) but has been in remission off immunosuppression for about 30 years.  Past Medical History: - Stills disease - ulcerative colitis treated with mesalamine, but not requiring immunosuppression since his 40s - Exploratory surgery for fever of unknown origin approximately 20 years ago ([facility]) - Diabetes for 20-25 years - hypertension, currently on lisinopril 5 mg daily - Hip replacement in 2019 - coronary bypass surgery in 2020 - Cataract surgery in May 2023 - Colon cancer diagnosed in 2023  Medications: - Allopurinol - Lisinopril 5 mg - Metformin 500/1000 daily - Empagliflozin (Jardiance) 25 - Evolocumab (Repatha) - reports intolerance to a number of statins - Sildenafil (daily) 20 mg recommended by radiation oncologist to improve blood flow - Note: Metoprolol was discontinued in 2024  Social History: - Lives independently in [place], [place] with a long-haired Sandip Sethi - Does not consume alcohol, smoke, or use recreational drugs - Practicing , works from home - Engages in daily physical activities including walking, yoga, weight training, and meditation - healthy dietary habits including restricted salt and processed foods   Cardiovascular Summary: ---------------------------------------------- EC2024: NSR 63 bpm 1st degree AV block, otherwise normal ---------------------------------------------- Echo: ---------------------------------------------- Stress: ---------------------------------------------- CT/MRI ---------------------------------------------- Remote/ambulatory rhythm monitoring:

## 2024-08-26 NOTE — ASSESSMENT
[FreeTextEntry1] : ---------------------------------------------- The patient presents with significant fluctuations in blood pressure leading to physical and cognitive impairments. Secondary considerations include the impact of previous chemotherapy and possible side effects of medications such as sildenafil. On examination, the SBP while seated was 155 mm Hg and equal in both arms, decreasing to 140 mmHg upon standing and then further on subsequent measurements until stabilizing at around 125 mmHg on repeat measurements after several minutes of standing.    Assessment: 1. Orthostasis without hypotension. May have autonomic impairment due to long standing diabetes, neuropathy related to oxaliplatin, or primary autonomic neuropathy 2. symptoms of cognitive difficulties may be related to cancer treatment rather than low blood pressure itself 3. Coronary artery disease without angina pectoris 4. Type 2 diabetes, controlled with diet and medication 5. locally advanced rectal cancer in remission with EPI after chemo/radiation   Recommendations: - Reduce frequency of blood pressure monitoring to once daily, instructions provided - Discontinue sildenafil for a few weeks to assess impact on blood pressure - echocardiogram with strain ([facility]) to assess for structural abnormalities and LV function -14 day rhythm monitoring given history of CAD. Flyo AT monitor placed today for correlation with symptoms - Monitor and document blood pressure readings once daily, including sitting and standing, if necessary - follow up appointment with me in one month  Above recommendations were discussed and reviewed in detail with the patient and his assistant and all questions were answered to the best of my ability and to their apparent satisfaction.

## 2024-08-26 NOTE — ASSESSMENT
[FreeTextEntry1] : ---------------------------------------------- The patient presents with significant fluctuations in blood pressure leading to physical and cognitive impairments. Secondary considerations include the impact of previous chemotherapy and possible side effects of medications such as sildenafil. On examination, the SBP while seated was 155 mm Hg and equal in both arms, decreasing to 140 mmHg upon standing and then further on subsequent measurements until stabilizing at around 125 mmHg on repeat measurements after several minutes of standing.    Assessment: 1. Orthostasis without hypotension. May have autonomic impairment due to long standing diabetes, neuropathy related to oxaliplatin, or primary autonomic neuropathy 2. symptoms of cognitive difficulties may be related to cancer treatment rather than low blood pressure itself 3. Coronary artery disease without angina pectoris 4. Type 2 diabetes, controlled with diet and medication 5. locally advanced rectal cancer in remission with EPI after chemo/radiation   Recommendations: - Reduce frequency of blood pressure monitoring to once daily, instructions provided - Discontinue sildenafil for a few weeks to assess impact on blood pressure - echocardiogram with strain ([facility]) to assess for structural abnormalities and LV function -14 day rhythm monitoring given history of CAD. Flyo AT monitor placed today for correlation with symptoms - Monitor and document blood pressure readings once daily, including sitting and standing, if necessary - follow up appointment with me in one month  Above recommendations were discussed and reviewed in detail with the patient and his assistant and all questions were answered to the best of my ability and to their apparent satisfaction.

## 2024-09-10 ENCOUNTER — APPOINTMENT (OUTPATIENT)
Dept: CARDIOLOGY | Facility: CLINIC | Age: 74
End: 2024-09-10

## 2024-09-19 ENCOUNTER — APPOINTMENT (OUTPATIENT)
Dept: CARDIOLOGY | Facility: CLINIC | Age: 74
End: 2024-09-19
Payer: MEDICARE

## 2024-09-19 PROCEDURE — 93356 MYOCRD STRAIN IMG SPCKL TRCK: CPT

## 2024-09-19 PROCEDURE — 93306 TTE W/DOPPLER COMPLETE: CPT

## 2024-10-07 ENCOUNTER — NON-APPOINTMENT (OUTPATIENT)
Age: 74
End: 2024-10-07

## 2024-10-07 ENCOUNTER — APPOINTMENT (OUTPATIENT)
Dept: CARDIOLOGY | Facility: CLINIC | Age: 74
End: 2024-10-07

## 2024-10-07 VITALS
HEART RATE: 76 BPM | RESPIRATION RATE: 16 BRPM | SYSTOLIC BLOOD PRESSURE: 148 MMHG | BODY MASS INDEX: 27.61 KG/M2 | TEMPERATURE: 97.7 F | WEIGHT: 171.08 LBS | DIASTOLIC BLOOD PRESSURE: 83 MMHG | OXYGEN SATURATION: 96 %

## 2024-10-07 DIAGNOSIS — I25.10 ATHEROSCLEROTIC HEART DISEASE OF NATIVE CORONARY ARTERY W/OUT ANGINA PECTORIS: ICD-10-CM

## 2024-10-07 DIAGNOSIS — E11.8 TYPE 2 DIABETES MELLITUS WITH UNSPECIFIED COMPLICATIONS: ICD-10-CM

## 2024-10-07 DIAGNOSIS — Z92.3 PERSONAL HISTORY OF IRRADIATION: ICD-10-CM

## 2024-10-07 DIAGNOSIS — I95.1 ORTHOSTATIC HYPOTENSION: ICD-10-CM

## 2024-10-07 DIAGNOSIS — I10 ESSENTIAL (PRIMARY) HYPERTENSION: ICD-10-CM

## 2024-10-07 PROCEDURE — 93000 ELECTROCARDIOGRAM COMPLETE: CPT | Mod: 59

## 2024-10-07 PROCEDURE — 99214 OFFICE O/P EST MOD 30 MIN: CPT | Mod: 25

## 2024-10-07 PROCEDURE — 93248 EXT ECG>7D<15D REV&INTERPJ: CPT

## 2024-12-28 ENCOUNTER — NON-APPOINTMENT (OUTPATIENT)
Age: 74
End: 2024-12-28

## 2025-01-13 ENCOUNTER — APPOINTMENT (OUTPATIENT)
Dept: CARDIOLOGY | Facility: CLINIC | Age: 75
End: 2025-01-13
Payer: MEDICARE

## 2025-01-13 ENCOUNTER — NON-APPOINTMENT (OUTPATIENT)
Age: 75
End: 2025-01-13

## 2025-01-13 VITALS
HEART RATE: 76 BPM | SYSTOLIC BLOOD PRESSURE: 187 MMHG | DIASTOLIC BLOOD PRESSURE: 87 MMHG | BODY MASS INDEX: 25.23 KG/M2 | WEIGHT: 157 LBS | HEIGHT: 66 IN | TEMPERATURE: 98 F | OXYGEN SATURATION: 97 %

## 2025-01-13 VITALS — DIASTOLIC BLOOD PRESSURE: 86 MMHG | SYSTOLIC BLOOD PRESSURE: 177 MMHG

## 2025-01-13 DIAGNOSIS — I10 ESSENTIAL (PRIMARY) HYPERTENSION: ICD-10-CM

## 2025-01-13 DIAGNOSIS — I95.1 ORTHOSTATIC HYPOTENSION: ICD-10-CM

## 2025-01-13 DIAGNOSIS — E11.8 TYPE 2 DIABETES MELLITUS WITH UNSPECIFIED COMPLICATIONS: ICD-10-CM

## 2025-01-13 DIAGNOSIS — Z92.3 PERSONAL HISTORY OF IRRADIATION: ICD-10-CM

## 2025-01-13 DIAGNOSIS — I25.10 ATHEROSCLEROTIC HEART DISEASE OF NATIVE CORONARY ARTERY W/OUT ANGINA PECTORIS: ICD-10-CM

## 2025-01-13 PROCEDURE — G2211 COMPLEX E/M VISIT ADD ON: CPT

## 2025-01-13 PROCEDURE — 93000 ELECTROCARDIOGRAM COMPLETE: CPT

## 2025-01-13 PROCEDURE — 99215 OFFICE O/P EST HI 40 MIN: CPT

## 2025-01-13 RX ORDER — MIDODRINE HYDROCHLORIDE 5 MG/1
5 TABLET ORAL
Refills: 0 | Status: ACTIVE | COMMUNITY
Start: 2025-01-13

## 2025-01-14 LAB
ALBUMIN SERPL ELPH-MCNC: 4.8 G/DL
ALP BLD-CCNC: 83 U/L
ALT SERPL-CCNC: 14 U/L
ANION GAP SERPL CALC-SCNC: 14 MMOL/L
AST SERPL-CCNC: 19 U/L
BILIRUB SERPL-MCNC: 0.5 MG/DL
BUN SERPL-MCNC: 19 MG/DL
CALCIUM SERPL-MCNC: 10.1 MG/DL
CHLORIDE SERPL-SCNC: 100 MMOL/L
CHOLEST SERPL-MCNC: 145 MG/DL
CO2 SERPL-SCNC: 27 MMOL/L
CREAT SERPL-MCNC: 1.01 MG/DL
DEPRECATED KAPPA LC FREE/LAMBDA SER: 2.1 RATIO
EGFR: 78 ML/MIN/1.73M2
ESTIMATED AVERAGE GLUCOSE: 160 MG/DL
FREE KAPPA URINE: 7.45 MG/L
FREE KAPPA/LAMDA RATIO: 5.4 RATIO
FREE LAMDA URINE: 1.38 MG/L
GLUCOSE SERPL-MCNC: 110 MG/DL
HBA1C MFR BLD HPLC: 7.2 %
HDLC SERPL-MCNC: 74 MG/DL
KAPPA LC CSF-MCNC: 1.13 MG/DL
KAPPA LC SERPL-MCNC: 2.37 MG/DL
LDLC SERPL CALC-MCNC: 57 MG/DL
NONHDLC SERPL-MCNC: 71 MG/DL
NT-PROBNP SERPL-MCNC: 359 PG/ML
POTASSIUM SERPL-SCNC: 4.9 MMOL/L
PROT SERPL-MCNC: 7.4 G/DL
SODIUM SERPL-SCNC: 141 MMOL/L
TRIGL SERPL-MCNC: 69 MG/DL
TSH SERPL-ACNC: 1.46 UIU/ML

## 2025-01-15 LAB
ALBUMIN MFR SERPL ELPH: 64.6 %
ALBUMIN SERPL-MCNC: 4.8 G/DL
ALBUMIN/GLOB SERPL: 1.8 RATIO
ALBUPE: 22.3 %
ALPHA1 GLOB MFR SERPL ELPH: 3.6 %
ALPHA1 GLOB SERPL ELPH-MCNC: 0.3 G/DL
ALPHA1UPE: 22.7 %
ALPHA2 GLOB MFR SERPL ELPH: 10.2 %
ALPHA2 GLOB SERPL ELPH-MCNC: 0.8 G/DL
ALPHA2UPE: 23 %
B-GLOBULIN MFR SERPL ELPH: 10.6 %
B-GLOBULIN SERPL ELPH-MCNC: 0.8 G/DL
BETAUPE: 14.4 %
GAMMA GLOB FLD ELPH-MCNC: 0.8 G/DL
GAMMA GLOB MFR SERPL ELPH: 11 %
GAMMAUPE: 17.6 %
IGA 24H UR QL IFE: NORMAL
INTERPRETATION SERPL IEP-IMP: NORMAL
KAPPA LC 24H UR QL: NORMAL
PROT PATTERN 24H UR ELPH-IMP: NORMAL
PROT SERPL-MCNC: 7.4 G/DL
PROT SERPL-MCNC: 7.4 G/DL
PROT UR-MCNC: <4 MG/DL
PROT UR-MCNC: <4 MG/DL

## 2025-01-20 ENCOUNTER — APPOINTMENT (OUTPATIENT)
Dept: NUCLEAR MEDICINE | Facility: IMAGING CENTER | Age: 75
End: 2025-01-20
Payer: MEDICARE

## 2025-01-20 ENCOUNTER — OUTPATIENT (OUTPATIENT)
Dept: OUTPATIENT SERVICES | Facility: HOSPITAL | Age: 75
LOS: 1 days | End: 2025-01-20
Payer: MEDICARE

## 2025-01-20 DIAGNOSIS — Z98.890 OTHER SPECIFIED POSTPROCEDURAL STATES: Chronic | ICD-10-CM

## 2025-01-20 DIAGNOSIS — Z87.81 PERSONAL HISTORY OF (HEALED) TRAUMATIC FRACTURE: Chronic | ICD-10-CM

## 2025-01-20 DIAGNOSIS — I95.1 ORTHOSTATIC HYPOTENSION: ICD-10-CM

## 2025-01-20 DIAGNOSIS — Z96.0 PRESENCE OF UROGENITAL IMPLANTS: Chronic | ICD-10-CM

## 2025-01-20 PROCEDURE — 78830 RP LOCLZJ TUM SPECT W/CT 1: CPT | Mod: 26

## 2025-01-20 PROCEDURE — A9538: CPT

## 2025-01-20 PROCEDURE — 78830 RP LOCLZJ TUM SPECT W/CT 1: CPT

## 2025-02-02 ENCOUNTER — NON-APPOINTMENT (OUTPATIENT)
Age: 75
End: 2025-02-02

## 2025-02-03 ENCOUNTER — APPOINTMENT (OUTPATIENT)
Dept: CARDIOLOGY | Facility: CLINIC | Age: 75
End: 2025-02-03
Payer: MEDICARE

## 2025-02-03 VITALS
WEIGHT: 159 LBS | DIASTOLIC BLOOD PRESSURE: 77 MMHG | OXYGEN SATURATION: 98 % | HEART RATE: 73 BPM | SYSTOLIC BLOOD PRESSURE: 149 MMHG | BODY MASS INDEX: 25.55 KG/M2 | HEIGHT: 66 IN | TEMPERATURE: 98.1 F

## 2025-02-03 DIAGNOSIS — I10 ESSENTIAL (PRIMARY) HYPERTENSION: ICD-10-CM

## 2025-02-03 DIAGNOSIS — C18.9 MALIGNANT NEOPLASM OF COLON, UNSPECIFIED: ICD-10-CM

## 2025-02-03 DIAGNOSIS — Z92.3 PERSONAL HISTORY OF IRRADIATION: ICD-10-CM

## 2025-02-03 DIAGNOSIS — E11.8 TYPE 2 DIABETES MELLITUS WITH UNSPECIFIED COMPLICATIONS: ICD-10-CM

## 2025-02-03 DIAGNOSIS — I25.10 ATHEROSCLEROTIC HEART DISEASE OF NATIVE CORONARY ARTERY W/OUT ANGINA PECTORIS: ICD-10-CM

## 2025-02-03 DIAGNOSIS — I95.1 ORTHOSTATIC HYPOTENSION: ICD-10-CM

## 2025-02-03 PROCEDURE — 99215 OFFICE O/P EST HI 40 MIN: CPT

## 2025-02-03 PROCEDURE — G2211 COMPLEX E/M VISIT ADD ON: CPT

## 2025-02-03 PROCEDURE — 93000 ELECTROCARDIOGRAM COMPLETE: CPT

## 2025-02-03 RX ORDER — LISINOPRIL 2.5 MG/1
2.5 TABLET ORAL DAILY
Qty: 30 | Refills: 3 | Status: ACTIVE | COMMUNITY
Start: 2025-02-03

## 2025-02-25 NOTE — H&P PST ADULT - ALCOHOL USE HISTORY SINGLE SELECT
Photo Preface (Leave Blank If You Do Not Want): Photographs were obtained today Detail Level: Zone never

## 2025-08-18 ENCOUNTER — APPOINTMENT (OUTPATIENT)
Dept: CARDIOLOGY | Facility: CLINIC | Age: 75
End: 2025-08-18